# Patient Record
Sex: FEMALE | URBAN - METROPOLITAN AREA
[De-identification: names, ages, dates, MRNs, and addresses within clinical notes are randomized per-mention and may not be internally consistent; named-entity substitution may affect disease eponyms.]

---

## 2022-08-24 ENCOUNTER — APPOINTMENT (OUTPATIENT)
Dept: URBAN - METROPOLITAN AREA CLINIC 231 | Age: 52
Setting detail: DERMATOLOGY
End: 2022-08-24

## 2022-08-24 DIAGNOSIS — Z41.9 ENCOUNTER FOR PROCEDURE FOR PURPOSES OTHER THAN REMEDYING HEALTH STATE, UNSPECIFIED: ICD-10-CM

## 2022-08-24 PROCEDURE — OTHER LASH LIFT AND TINT: OTHER

## 2022-08-24 ASSESSMENT — LOCATION ZONE DERM: LOCATION ZONE: EYELID

## 2022-08-24 ASSESSMENT — LOCATION DETAILED DESCRIPTION DERM: LOCATION DETAILED: LEFT LATERAL SUPERIOR EYELID

## 2022-08-24 ASSESSMENT — LOCATION SIMPLE DESCRIPTION DERM: LOCATION SIMPLE: LEFT SUPERIOR EYELID

## 2022-08-24 NOTE — PROCEDURE: LASH LIFT AND TINT
Detail Level: Zone
Price (Use Numbers Only, No Special Characters Or $): 85
Intro And Consent: Although Eyelash Lift is effective in most cases, no guarantee can be made that a specific client will benefit from this procedure. This process uses lifting pads with special developer and neutralizer safe for the eyelash area to create more of a lift in the lashes. It can  last 6 to 8 weeks and does vary on individuals. All instruments have be cleaned and are free of contamination. Generally , the results are excellent. However, a perfect result is not guaranteed. The procedure was thoroughly explained and the patient expressed their verbal understanding.
Procedure Text: Eyelashes were cleansed with an oil free eye makeup remover. Silicone one pads were placed on bottom lashes to cover them. Glue was placed to the back of the lifting pads and where allowed to dry on both eye lids. Glue was applied to the dome area of the  lifting pads and top lashes were sweeped  up using a rosewood stick. Lash perm was applied to middle of the lashes and was timed for 8 minutes. Using a dry QTIP lotion removed. Neutralizer was added to the same area for 5 minutes and then dabbed off with dry QTIP. Lashes now can be dyed using black for a color for 2 mins. \\nDye solution was removed after 2 mins with a with damp cotton pad. Lifting pads were removed. \\nDetailed post-care and follow-up instructions were discussed.